# Patient Record
Sex: MALE | Race: WHITE | NOT HISPANIC OR LATINO | Employment: UNEMPLOYED | URBAN - METROPOLITAN AREA
[De-identification: names, ages, dates, MRNs, and addresses within clinical notes are randomized per-mention and may not be internally consistent; named-entity substitution may affect disease eponyms.]

---

## 2019-08-31 ENCOUNTER — HOSPITAL ENCOUNTER (EMERGENCY)
Facility: HOSPITAL | Age: 69
Discharge: HOME | End: 2019-08-31
Attending: EMERGENCY MEDICINE
Payer: MEDICARE

## 2019-08-31 VITALS
HEART RATE: 82 BPM | SYSTOLIC BLOOD PRESSURE: 126 MMHG | OXYGEN SATURATION: 96 % | HEIGHT: 71 IN | DIASTOLIC BLOOD PRESSURE: 60 MMHG | BODY MASS INDEX: 37.1 KG/M2 | RESPIRATION RATE: 16 BRPM | WEIGHT: 265 LBS | TEMPERATURE: 98 F

## 2019-08-31 DIAGNOSIS — Z79.899 POLYPHARMACY: ICD-10-CM

## 2019-08-31 DIAGNOSIS — G93.40 ACUTE ENCEPHALOPATHY: ICD-10-CM

## 2019-08-31 DIAGNOSIS — F10.10 ALCOHOL ABUSE: Primary | ICD-10-CM

## 2019-08-31 LAB
ALBUMIN SERPL BCP-MCNC: 4 G/DL (ref 3.5–5.2)
ALP SERPL-CCNC: 71 U/L (ref 55–135)
ALT SERPL W/O P-5'-P-CCNC: 73 U/L (ref 10–44)
AMPHET+METHAMPHET UR QL: NEGATIVE
ANION GAP SERPL CALC-SCNC: 15 MMOL/L (ref 8–16)
AST SERPL-CCNC: 56 U/L (ref 10–40)
BARBITURATES UR QL SCN>200 NG/ML: NEGATIVE
BASOPHILS # BLD AUTO: 0.02 K/UL (ref 0–0.2)
BASOPHILS NFR BLD: 0.3 % (ref 0–1.9)
BENZODIAZ UR QL SCN>200 NG/ML: NEGATIVE
BILIRUB SERPL-MCNC: 0.7 MG/DL (ref 0.1–1)
BILIRUB UR QL STRIP: NEGATIVE
BNP SERPL-MCNC: 12 PG/ML (ref 0–99)
BUN SERPL-MCNC: 19 MG/DL (ref 8–23)
BZE UR QL SCN: NEGATIVE
CALCIUM SERPL-MCNC: 9.4 MG/DL (ref 8.7–10.5)
CANNABINOIDS UR QL SCN: NEGATIVE
CHLORIDE SERPL-SCNC: 103 MMOL/L (ref 95–110)
CLARITY UR: CLEAR
CO2 SERPL-SCNC: 21 MMOL/L (ref 23–29)
COLOR UR: YELLOW
CREAT SERPL-MCNC: 0.8 MG/DL (ref 0.5–1.4)
CREAT UR-MCNC: 15.6 MG/DL (ref 23–375)
DIFFERENTIAL METHOD: ABNORMAL
EOSINOPHIL # BLD AUTO: 0.3 K/UL (ref 0–0.5)
EOSINOPHIL NFR BLD: 3.8 % (ref 0–8)
ERYTHROCYTE [DISTWIDTH] IN BLOOD BY AUTOMATED COUNT: 13.5 % (ref 11.5–14.5)
EST. GFR  (AFRICAN AMERICAN): >60 ML/MIN/1.73 M^2
EST. GFR  (NON AFRICAN AMERICAN): >60 ML/MIN/1.73 M^2
ETHANOL SERPL-MCNC: 79 MG/DL
GLUCOSE SERPL-MCNC: 71 MG/DL (ref 70–110)
GLUCOSE UR QL STRIP: NEGATIVE
HCT VFR BLD AUTO: 42.7 % (ref 40–54)
HGB BLD-MCNC: 13.9 G/DL (ref 14–18)
HGB UR QL STRIP: NEGATIVE
INR PPP: 1.1 (ref 0.8–1.2)
KETONES UR QL STRIP: NEGATIVE
LACTATE SERPL-SCNC: 2.7 MMOL/L (ref 0.5–2.2)
LEUKOCYTE ESTERASE UR QL STRIP: NEGATIVE
LIPASE SERPL-CCNC: 53 U/L (ref 4–60)
LYMPHOCYTES # BLD AUTO: 2 K/UL (ref 1–4.8)
LYMPHOCYTES NFR BLD: 29.7 % (ref 18–48)
MAGNESIUM SERPL-MCNC: 2 MG/DL (ref 1.6–2.6)
MCH RBC QN AUTO: 29 PG (ref 27–31)
MCHC RBC AUTO-ENTMCNC: 32.6 G/DL (ref 32–36)
MCV RBC AUTO: 89 FL (ref 82–98)
METHADONE UR QL SCN>300 NG/ML: NEGATIVE
MONOCYTES # BLD AUTO: 0.7 K/UL (ref 0.3–1)
MONOCYTES NFR BLD: 10.5 % (ref 4–15)
NEUTROPHILS # BLD AUTO: 3.7 K/UL (ref 1.8–7.7)
NEUTROPHILS NFR BLD: 55.7 % (ref 38–73)
NITRITE UR QL STRIP: NEGATIVE
OPIATES UR QL SCN: ABNORMAL
PCP UR QL SCN>25 NG/ML: NEGATIVE
PH UR STRIP: 6 [PH] (ref 5–8)
PLATELET # BLD AUTO: 136 K/UL (ref 150–350)
PMV BLD AUTO: 10.3 FL (ref 9.2–12.9)
POCT GLUCOSE: 69 MG/DL (ref 70–110)
POCT GLUCOSE: 96 MG/DL (ref 70–110)
POTASSIUM SERPL-SCNC: 3.9 MMOL/L (ref 3.5–5.1)
PROT SERPL-MCNC: 6.6 G/DL (ref 6–8.4)
PROT UR QL STRIP: NEGATIVE
PROTHROMBIN TIME: 11.1 SEC (ref 9–12.5)
RBC # BLD AUTO: 4.8 M/UL (ref 4.6–6.2)
SODIUM SERPL-SCNC: 139 MMOL/L (ref 136–145)
SP GR UR STRIP: <=1.005 (ref 1–1.03)
TOXICOLOGY INFORMATION: ABNORMAL
TROPONIN I SERPL DL<=0.01 NG/ML-MCNC: <0.006 NG/ML (ref 0–0.03)
TSH SERPL DL<=0.005 MIU/L-ACNC: 1.77 UIU/ML (ref 0.4–4)
URN SPEC COLLECT METH UR: ABNORMAL
UROBILINOGEN UR STRIP-ACNC: NEGATIVE EU/DL
WBC # BLD AUTO: 6.86 K/UL (ref 3.9–12.7)

## 2019-08-31 PROCEDURE — 63600175 PHARM REV CODE 636 W HCPCS: Performed by: EMERGENCY MEDICINE

## 2019-08-31 PROCEDURE — 96360 HYDRATION IV INFUSION INIT: CPT

## 2019-08-31 PROCEDURE — 93010 ELECTROCARDIOGRAM REPORT: CPT | Mod: ,,, | Performed by: INTERNAL MEDICINE

## 2019-08-31 PROCEDURE — 84484 ASSAY OF TROPONIN QUANT: CPT

## 2019-08-31 PROCEDURE — 85610 PROTHROMBIN TIME: CPT

## 2019-08-31 PROCEDURE — 93010 EKG 12-LEAD: ICD-10-PCS | Mod: ,,, | Performed by: INTERNAL MEDICINE

## 2019-08-31 PROCEDURE — 93005 ELECTROCARDIOGRAM TRACING: CPT

## 2019-08-31 PROCEDURE — 85025 COMPLETE CBC W/AUTO DIFF WBC: CPT

## 2019-08-31 PROCEDURE — 81003 URINALYSIS AUTO W/O SCOPE: CPT | Mod: 59

## 2019-08-31 PROCEDURE — 82962 GLUCOSE BLOOD TEST: CPT

## 2019-08-31 PROCEDURE — 80053 COMPREHEN METABOLIC PANEL: CPT

## 2019-08-31 PROCEDURE — 83735 ASSAY OF MAGNESIUM: CPT

## 2019-08-31 PROCEDURE — 83690 ASSAY OF LIPASE: CPT

## 2019-08-31 PROCEDURE — 99285 EMERGENCY DEPT VISIT HI MDM: CPT | Mod: 25

## 2019-08-31 PROCEDURE — 83880 ASSAY OF NATRIURETIC PEPTIDE: CPT

## 2019-08-31 PROCEDURE — 80320 DRUG SCREEN QUANTALCOHOLS: CPT

## 2019-08-31 PROCEDURE — 80307 DRUG TEST PRSMV CHEM ANLYZR: CPT

## 2019-08-31 PROCEDURE — 83605 ASSAY OF LACTIC ACID: CPT

## 2019-08-31 PROCEDURE — 84443 ASSAY THYROID STIM HORMONE: CPT

## 2019-08-31 RX ADMIN — SODIUM CHLORIDE 1000 ML: 0.9 INJECTION, SOLUTION INTRAVENOUS at 01:08

## 2019-08-31 NOTE — ED PROVIDER NOTES
Encounter Date: 8/31/2019    SCRIBE #1 NOTE: I, Sole Cooper, am scribing for, and in the presence of,  Dr. Baum. I have scribed the entire note.       History     Chief Complaint   Patient presents with    Altered Mental Status     Patient presents to ED secondary to altered mental status. Per EMS and family, patient was on porch drinking beers and took night time meds including lyrica and lisinopril and had a syncopal episode. Per EMS, patient became altered with pinpoint pupils. Was given 4mg of nacan by EMS.      Time seen by provider: 12:40 AM    This is a 68 y.o. male presents with altered mental status per EMS. Per EMS the patient was with his family in the back patio drinking when they saw he was asleep on the chair. The patient was said to have drunk 6 beers and took medication, however it is unknown if that was his prescription. Patient was presented taking Hydrocodone, Lyrica, Lisinopril, and Glipizide. Per family they could not wake him up so they laid him on the ground and called for an EMS. Per EMS he had pin point pupils, and he was given 4 Narcan following with him awakening and speaking to them. However, the patient became altered again. Per family the patient is visiting from [place]. History is limited due to patients altered mental status.     The history is provided by the EMS personnel. The history is limited by the condition of the patient.     Review of patient's allergies indicates:  No Known Allergies  History reviewed. No pertinent past medical history.  No past surgical history on file.  History reviewed. No pertinent family history.  Social History     Tobacco Use    Smoking status: Not on file   Substance Use Topics    Alcohol use: Not on file    Drug use: Not on file     Review of Systems   Unable to perform ROS: Mental status change       Physical Exam     Initial Vitals [08/31/19 0019]   BP Pulse Resp Temp SpO2   130/78 78 (!) 24 98 °F (36.7 °C) 97 %      MAP       --          Physical Exam    Constitutional: He appears well-developed and well-nourished.   HENT:   Head: Normocephalic and atraumatic.   Eyes:   2 mm constricted   Neck: Normal range of motion. Neck supple.   Cardiovascular: Normal rate, regular rhythm and normal heart sounds.   Pulmonary/Chest: Breath sounds normal. No respiratory distress.   Abdominal: Soft.   Musculoskeletal: Normal range of motion.   Neurological:   Somnolent, arousable, moving all extremities, does not obey commands   Skin: Skin is warm and dry.         ED Course   Procedures  Labs Reviewed   CBC W/ AUTO DIFFERENTIAL - Abnormal; Notable for the following components:       Result Value    Hemoglobin 13.9 (*)     Platelets 136 (*)     All other components within normal limits   COMPREHENSIVE METABOLIC PANEL - Abnormal; Notable for the following components:    CO2 21 (*)     AST 56 (*)     ALT 73 (*)     All other components within normal limits   DRUG SCREEN PANEL, URINE EMERGENCY - Abnormal; Notable for the following components:    Creatinine, Random Ur 15.6 (*)     All other components within normal limits    Narrative:     Preferred Collection Type->Urine, Clean Catch   LACTIC ACID, PLASMA - Abnormal; Notable for the following components:    Lactate (Lactic Acid) 2.7 (*)     All other components within normal limits   URINALYSIS, REFLEX TO URINE CULTURE - Abnormal; Notable for the following components:    Specific Gravity, UA <=1.005 (*)     All other components within normal limits    Narrative:     Preferred Collection Type->Urine, Clean Catch   ALCOHOL,MEDICAL (ETHANOL) - Abnormal; Notable for the following components:    Alcohol, Medical, Serum 79 (*)     All other components within normal limits   POCT GLUCOSE - Abnormal; Notable for the following components:    POCT Glucose 69 (*)     All other components within normal limits   B-TYPE NATRIURETIC PEPTIDE   LIPASE   MAGNESIUM   PROTIME-INR   TROPONIN I   TSH   POCT GLUCOSE MONITORING CONTINUOUS      EKG Readings: (Independently Interpreted)   Sinus rhythm at 100 beats per minute, normal intervals, normal axis, no acute ST elevations       Imaging Results          CT Head Without Contrast (Final result)  Result time 08/31/19 01:13:47    Final result by Vicky Burger MD (08/31/19 01:13:47)                 Impression:      No acute intracranial abnormalities identified.    Paranasal sinus disease as above.      Electronically signed by: Vicky Burger MD  Date:    08/31/2019  Time:    01:13             Narrative:    EXAMINATION:  CT HEAD WITHOUT CONTRAST    CLINICAL HISTORY:  Confusion/delirium, altered LOC, unexplained;    TECHNIQUE:  Low dose axial images were obtained through the head.  Coronal and sagittal reformations were also performed. Contrast was not administered.    COMPARISON:  None.    FINDINGS:  No evidence of acute/recent major vascular distribution cerebral infarction, intraparenchymal hemorrhage, or intra-axial space occupying lesion. The ventricular system is normal in size and configuration with no evidence of hydrocephalus. No effacement of the skull-base cisterns. No abnormal extra-axial fluid collections or blood products.    There is left maxillary sinus disease with near complete opacification the left maxillary antrum with suspected large mucous retention cyst.  Mild patchy ethmoid, left sphenoid, and right maxillary sinus disease are also noted.  Remaining visualized paranasal sinuses and mastoid air cells are clear. The calvarium shows no significant abnormality.                               X-Ray Chest AP Portable (Final result)  Result time 08/31/19 00:58:42    Final result by Vicky Burger MD (08/31/19 00:58:42)                 Impression:      See above.      Electronically signed by: Vicky Burger MD  Date:    08/31/2019  Time:    00:58             Narrative:    EXAMINATION:  XR CHEST AP PORTABLE    CLINICAL HISTORY:  Altered mental status;    TECHNIQUE:  Single  frontal view of the chest was performed.    COMPARISON:  None    FINDINGS:  Lungs are hypoinflated which accentuates cardiac silhouette and pulmonary vascular markings.  There is prominent elevation of the right hemidiaphragm resulting in asymmetric volume loss on the right.  Difficult to exclude mild pulmonary interstitial edema.  No focal consolidation, pneumothorax, or significant pleural effusion seen.  No acute osseous abnormality identified.                                 Medical Decision Making:   Initial Assessment:   A 68-year-old male presents to the ER brought in by ambulance for evaluation of altered mental status.  History is limited due to patient condition.  EMS reported that Family other patients in the became altered, hypersomnolent.  They reported patient has been drinking alcohol and mixing his medications.  No obvious trauma fall or head to head.  They noted his pupils were pinpoint and gave Narcan with improvement.  Patient arrived in the ER, he is altered, he is maintaining his airway, moving all extremities, not obeying commands, is hypersomnolent.  No obvious signs of trauma, no neurological deficits.  Differential is broad including alcohol intoxication, electrolyte abnormality, infection, intracranial process, polypharmacy, opiate overdose personally cause.  Will obtain blood work CT imaging tox screen will reassess.  Clinical Tests:   Lab Tests: Ordered and Reviewed  Radiological Study: Ordered and Reviewed  Medical Tests: Ordered and Reviewed            Scribe Attestation:   Scribe #1: I performed the above scribed service and the documentation accurately describes the services I performed. I attest to the accuracy of the note.    Attending Attestation:           Physician Attestation for Scribe:  Physician Attestation Statement for Scribe #1: I, Dr. Baum, reviewed documentation, as scribed by Sole Cooper in my presence, and it is both accurate and complete.                 ED  Course as of Aug 31 0334   Sat Aug 31, 2019   0151 Resting comfortably in bed, family bedside, patient is much more awake.  Family endorses that patient consumed moderate amount of alcohol, taking his Percocet, Lyrica, and other medications and did not eat.  They believe this because the.  Patient is still mildly hyper somnolent but arousable, maintaining airway.  CT negative for acute process.  Awaiting blood work will reassess.    [SE]   0241 Resting comfortably in bed, no acute distress, patient is much more awake, answering questions appropriately, no longer showing signs of hypersomnolence.  Appears clinically sober, answering questions appropriately.  Sober family members are bedside, which to take patient home.  Discussed with family and patient results, discussed the for alcohol cessation, discontinue polypharmacy and drug abuse.  Strict return precautions were discussed patient understood and agreed with.  Patient will be discharged.    [SE]      ED Course User Index  [SE] Yoshi Baum MD     Clinical Impression:       ICD-10-CM ICD-9-CM   1. Alcohol abuse F10.10 305.00   2. Acute encephalopathy G93.40 348.30   3. Polypharmacy Z79.899 V58.69         Disposition:   Disposition: Discharged  Condition: Stable                        Yoshi Baum MD  08/31/19 0335       Yoshi Baum MD  08/31/19 0337